# Patient Record
(demographics unavailable — no encounter records)

---

## 2024-11-14 NOTE — PHYSICAL EXAM
[Normal Sclera/Conjunctiva] : normal sclera/conjunctiva [Normal Oropharynx] : the oropharynx was normal [Normal TMs] : both tympanic membranes were normal [Normal] : normal rate, regular rhythm, normal S1 and S2 and no murmur heard [de-identified] : nasal congestion

## 2024-11-14 NOTE — HISTORY OF PRESENT ILLNESS
[Cold Symptoms] : cold symptoms [Mild] : mild [___ Weeks ago] :  [unfilled] weeks ago [Sudden] : suddenly [Constant] : constant [Congestion] : congestion [Wheezing] : wheezing [Anorexia] : anorexia [Shortness Of Breath] : shortness of breath [Headache] : headache [Rest] : rest [OTC Remedies] : OTC remedies [At Night] : at night [Improving] : improving [Cough] : no cough [Sore Throat] : no sore throat [Chills] : no chills [Earache] : no earache [Fatigue] : not fatigue [Fever] : no fever [de-identified] : Clogged ears [FreeTextEntry5] : Mucinex  [FreeTextEntry8] : The patient is a 65 year old F who presents to the office for the following concerns:  The patient endorses 1 week of nasal congestion, cough, myalgias, fatigue She was working at the [employer] on election night and notes multiple sick contacts No fever or chills. No dyspnea or CP

## 2024-12-10 NOTE — HISTORY OF PRESENT ILLNESS
[FreeTextEntry1] : FLORES [de-identified] : The patient is a 65 year old F who presents to the office for an annual wellness examination and follow up of chronic medical conditions. She reports compliance with all prescribed medical therapy and dietary restrictions. The patient has no acute concerns today.  Routine Health maintenance (as applicable) Mammogram: 5/2024 Pap Smear: 1/2023 DEXA (65+): 2023 Colonoscopy (45+): 2021   COVID vaccine series: Flu: deferred  Tdap (19-64): n/a Shingles (50+, immunocompetent): declines  PCV (>66yo/other conditions): declines

## 2024-12-10 NOTE — HISTORY OF PRESENT ILLNESS
[FreeTextEntry1] : FLORES [de-identified] : The patient is a 65 year old F who presents to the office for an annual wellness examination and follow up of chronic medical conditions. She reports compliance with all prescribed medical therapy and dietary restrictions. The patient has no acute concerns today.  Routine Health maintenance (as applicable) Mammogram: 5/2024 Pap Smear: 1/2023 DEXA (65+): 2023 Colonoscopy (45+): 2021   COVID vaccine series: Flu: deferred  Tdap (19-64): n/a Shingles (50+, immunocompetent): declines  PCV (>66yo/other conditions): declines

## 2024-12-10 NOTE — HEALTH RISK ASSESSMENT
[Never (0 pts)] : Never (0 points) [No] : In the past 12 months have you used drugs other than those required for medical reasons? No [No falls in past year] : Patient reported no falls in the past year [0] : 2) Feeling down, depressed, or hopeless: Not at all (0) [Fully functional (bathing, dressing, toileting, transferring, walking, feeding)] : Fully functional (bathing, dressing, toileting, transferring, walking, feeding) [Fully functional (using the telephone, shopping, preparing meals, housekeeping, doing laundry, using] : Fully functional and needs no help or supervision to perform IADLs (using the telephone, shopping, preparing meals, housekeeping, doing laundry, using transportation, managing medications and managing finances) [Smoke Detector] : smoke detector [Carbon Monoxide Detector] : carbon monoxide detector [Seat Belt] :  uses seat belt [Sunscreen] : uses sunscreen [Very Good] : ~his/her~ current health as very good [Good] : ~his/her~  mood as  good [Former] : Former [Patient reported mammogram was normal] : Patient reported mammogram was normal [Patient reported PAP Smear was normal] : Patient reported PAP Smear was normal [Patient reported bone density results were abnormal] : Patient reported bone density results were abnormal [Patient reported colonoscopy was normal] : Patient reported colonoscopy was normal [HIV test declined] : HIV test declined [Hepatitis C test declined] : Hepatitis C test declined [PHQ-2 Negative - No further assessment needed] : PHQ-2 Negative - No further assessment needed [> 15 Years] : > 15 Years [FreeTextEntry1] : Heart burn (on Protonix) & wants to schedule a Pap smear  [de-identified] : no [de-identified] : no [Audit-CScore] : 0 [de-identified] : walking [de-identified] : good [[id number]] : 0 [Change in mental status noted] : No change in mental status noted [Reports changes in hearing] : Reports no changes in hearing [Reports changes in vision] : Reports no changes in vision [Reports changes in dental health] : Reports no changes in dental health [MammogramDate] : 05/2024 [PapSmearDate] : 01/2023 [BoneDensityDate] : 11/14/2023 [ColonoscopyDate] : 01/2021

## 2024-12-10 NOTE — ASSESSMENT
[FreeTextEntry1] : The patient is a 65 year old F who presents to the office for an annual wellness examination - Fasting labs to screen for anemia, electrolyte disturbances, DM, lipid disorders, and additional metabolic disorders - EKG: NSR - PHQ2 performed: 0 points - Immunizations: declines  - Encouraged routine dental, vision, dermatology screenings & age-appropriate physical activity - Colonoscopy screening: UTD - Mammogram: UTD - Pap smear: UTD - DEXA: UTD    Where applicable: - Labs drawn in office. - Appropriate medication renewal(s) provided. - Provided scripts for necessary imaging and/or referrals.     Further management to be completed pending lab results and/or imaging studies. All of the patient's questions and concerns were answered in detail.

## 2024-12-10 NOTE — HEALTH RISK ASSESSMENT
[Never (0 pts)] : Never (0 points) [No] : In the past 12 months have you used drugs other than those required for medical reasons? No [No falls in past year] : Patient reported no falls in the past year [0] : 2) Feeling down, depressed, or hopeless: Not at all (0) [Fully functional (bathing, dressing, toileting, transferring, walking, feeding)] : Fully functional (bathing, dressing, toileting, transferring, walking, feeding) [Fully functional (using the telephone, shopping, preparing meals, housekeeping, doing laundry, using] : Fully functional and needs no help or supervision to perform IADLs (using the telephone, shopping, preparing meals, housekeeping, doing laundry, using transportation, managing medications and managing finances) [Smoke Detector] : smoke detector [Carbon Monoxide Detector] : carbon monoxide detector [Seat Belt] :  uses seat belt [Sunscreen] : uses sunscreen [Very Good] : ~his/her~ current health as very good [Good] : ~his/her~  mood as  good [Former] : Former [Patient reported mammogram was normal] : Patient reported mammogram was normal [Patient reported PAP Smear was normal] : Patient reported PAP Smear was normal [Patient reported bone density results were abnormal] : Patient reported bone density results were abnormal [Patient reported colonoscopy was normal] : Patient reported colonoscopy was normal [HIV test declined] : HIV test declined [Hepatitis C test declined] : Hepatitis C test declined [PHQ-2 Negative - No further assessment needed] : PHQ-2 Negative - No further assessment needed [> 15 Years] : > 15 Years [FreeTextEntry1] : Heart burn (on Protonix) & wants to schedule a Pap smear  [de-identified] : no [de-identified] : no [Audit-CScore] : 0 [de-identified] : walking [de-identified] : good [[id number]] : 0 [Change in mental status noted] : No change in mental status noted [Reports changes in hearing] : Reports no changes in hearing [Reports changes in vision] : Reports no changes in vision [Reports changes in dental health] : Reports no changes in dental health [MammogramDate] : 05/2024 [PapSmearDate] : 01/2023 [BoneDensityDate] : 11/14/2023 [ColonoscopyDate] : 01/2021